# Patient Record
Sex: MALE | Race: WHITE | HISPANIC OR LATINO | Employment: OTHER | URBAN - METROPOLITAN AREA
[De-identification: names, ages, dates, MRNs, and addresses within clinical notes are randomized per-mention and may not be internally consistent; named-entity substitution may affect disease eponyms.]

---

## 2020-07-08 ENCOUNTER — HOSPITAL ENCOUNTER (EMERGENCY)
Facility: HOSPITAL | Age: 25
Discharge: HOME | End: 2020-07-08
Attending: EMERGENCY MEDICINE
Payer: OTHER GOVERNMENT

## 2020-07-08 VITALS
OXYGEN SATURATION: 98 % | HEIGHT: 71 IN | BODY MASS INDEX: 31.5 KG/M2 | TEMPERATURE: 100 F | DIASTOLIC BLOOD PRESSURE: 82 MMHG | SYSTOLIC BLOOD PRESSURE: 124 MMHG | HEART RATE: 77 BPM | RESPIRATION RATE: 20 BRPM | WEIGHT: 225 LBS

## 2020-07-08 DIAGNOSIS — R05.9 COUGH: ICD-10-CM

## 2020-07-08 DIAGNOSIS — J06.9 VIRAL URI WITH COUGH: Primary | ICD-10-CM

## 2020-07-08 LAB
INFLUENZA A, MOLECULAR: NEGATIVE
INFLUENZA B, MOLECULAR: NEGATIVE
SPECIMEN SOURCE: NORMAL

## 2020-07-08 PROCEDURE — 99283 EMERGENCY DEPT VISIT LOW MDM: CPT | Mod: 25

## 2020-07-08 PROCEDURE — 87502 INFLUENZA DNA AMP PROBE: CPT

## 2020-07-08 PROCEDURE — U0003 INFECTIOUS AGENT DETECTION BY NUCLEIC ACID (DNA OR RNA); SEVERE ACUTE RESPIRATORY SYNDROME CORONAVIRUS 2 (SARS-COV-2) (CORONAVIRUS DISEASE [COVID-19]), AMPLIFIED PROBE TECHNIQUE, MAKING USE OF HIGH THROUGHPUT TECHNOLOGIES AS DESCRIBED BY CMS-2020-01-R: HCPCS

## 2020-07-08 PROCEDURE — 25000003 PHARM REV CODE 250: Performed by: PHYSICIAN ASSISTANT

## 2020-07-08 RX ORDER — ACETAMINOPHEN 500 MG
1000 TABLET ORAL
Status: COMPLETED | OUTPATIENT
Start: 2020-07-08 | End: 2020-07-08

## 2020-07-08 RX ORDER — ALBUTEROL SULFATE 90 UG/1
1-2 AEROSOL, METERED RESPIRATORY (INHALATION) EVERY 6 HOURS PRN
Qty: 6.7 G | Refills: 0 | Status: SHIPPED | OUTPATIENT
Start: 2020-07-08 | End: 2021-07-08

## 2020-07-08 RX ADMIN — ACETAMINOPHEN 1000 MG: 500 TABLET ORAL at 11:07

## 2020-07-08 NOTE — DISCHARGE INSTRUCTIONS
Your Influenza test was negative.  Your chest x-ray did not reveal any evidence of pneumonia or consolidation.  Your COVID test is pending.  You are advised to  self-quarantine, rest, and drink plenty of clear fluids.  You are instructed to return to the emergency department immediately for any new or worsening symptoms.

## 2020-07-08 NOTE — ED PROVIDER NOTES
Encounter Date: 7/8/2020       History     Chief Complaint   Patient presents with    Fever     pt presents to ED today c/o fever, bilateral eye pain, and vomting x 3 days. pt not actively vomiting at this time.     Eye Pain    Emesis    COVID-19 Concerns     24-year-old male presents to the emergency department for evaluation of 3 day history of generalized body aches, intermittent fever, nasal congestion and cough.  Patient reports be emerging interpretation that symptoms began gradually 3 days ago and have been intermittent since onset.  She reports the fever is and intermittent subjective fever where he feels warm.  He denies any headache, dizziness, vision changes, eye pain, neck pain, chest pain, shortness of breath, abdominal pain, nausea, vomiting or diarrhea.  No treatments attempted today prior to arrival.  He denies any known sick contacts.        Review of patient's allergies indicates:  No Known Allergies  No past medical history on file.  No past surgical history on file.  No family history on file.  Social History     Tobacco Use    Smoking status: Not on file   Substance Use Topics    Alcohol use: Not on file    Drug use: Not on file     Review of Systems   Constitutional: Positive for chills and fever. Negative for activity change and appetite change.   HENT: Negative for congestion, postnasal drip, rhinorrhea, sinus pressure, sore throat, trouble swallowing and voice change.    Eyes: Negative for photophobia, pain, redness and visual disturbance.   Respiratory: Positive for cough. Negative for chest tightness and shortness of breath.    Cardiovascular: Negative for chest pain.   Gastrointestinal: Negative for abdominal pain, blood in stool, constipation, diarrhea, nausea and vomiting.   Genitourinary: Negative for dysuria, flank pain, frequency, hematuria and testicular pain.   Musculoskeletal: Negative for back pain, joint swelling, neck pain and neck stiffness.   Skin: Negative for rash.    Neurological: Negative for dizziness, syncope, weakness, light-headedness, numbness and headaches.   Hematological: Does not bruise/bleed easily.       Physical Exam     Initial Vitals [07/08/20 1041]   BP Pulse Resp Temp SpO2   124/82 77 20 100 °F (37.8 °C) 98 %      MAP       --         Physical Exam    Nursing note and vitals reviewed.  Constitutional: He appears well-developed and well-nourished. He is not diaphoretic. No distress.   HENT:   Head: Normocephalic and atraumatic.   Right Ear: External ear normal.   Left Ear: External ear normal.   Mouth/Throat: Oropharynx is clear and moist. No oropharyngeal exudate.   Eyes: Conjunctivae and EOM are normal. Pupils are equal, round, and reactive to light.   Neck: Normal range of motion. Neck supple.   Cardiovascular: Normal rate, regular rhythm and normal heart sounds.   Pulmonary/Chest: Breath sounds normal. No respiratory distress. He has no wheezes. He has no rhonchi. He has no rales. He exhibits no tenderness.   Lymphadenopathy:     He has no cervical adenopathy.   Neurological: He is alert and oriented to person, place, and time.   Skin: Skin is warm and dry.   Psychiatric: He has a normal mood and affect.         ED Course   Procedures  Labs Reviewed - No data to display       Imaging Results    None          Medical Decision Making:   Initial Assessment:   24-year-old man presents for evaluation of cough, fever and generalized body aches.  Physical exam reveals a nontoxic-appearing male in no acute distress.  Patient is mildly febrile, but other vital signs within normal limits.  Neurological exam reveals an alert and oriented patient.  Normal range of motion noted in the neck.  No respiratory distress, accessory muscle use or conversational dyspnea noted.  Patient denies any abdominal pain.  Differential Diagnosis:   COVID-19  Viral URI  Influenza  Chest x-ray ordered to assess possible pneumonia or consolidation  ED Management:  Chest x-ray report reveals  no acute findings.  COVID pending.  Influenza negative.  These findings were discussed at length patient verbalizes understanding and agreement with course of treatment.  Instructed patient to self quarantine, rest and drink plenty of clear fluids.  Instructed patient to return to the emergency department immediately for any new or worsening symptoms.                                 Clinical Impression:       ICD-10-CM ICD-9-CM   1. Viral URI with cough  J06.9 465.9    B97.89    2. Cough  R05 786.2                                Mari Barrientos PA-C  07/08/20 1239

## 2020-07-08 NOTE — ED NOTES
Patient identifiers for Filippo Frazier checked and correct.    LOC: The patient is awake, alert and aware of environment with an appropriate affect, the patient is oriented x 3 and speaking appropriately.  APPEARANCE: Patient resting comfortably and in no acute distress, patient is clean and well groomed, patient's clothing are properly fastened.  SKIN: The skin is warm and dry, patient has age appropriate skin turgor and moist mucus membranes, skin intact, no breakdown or bruising noted.  MUSCULOSKELETAL: Patient moving all extremities well, no obvious swelling or deformities noted.  RESPIRATORY: Airway is open and patent, respirations are spontaneous, patient has a normal effort and rate, no accessory muscle use noted.  Clear breath sounds bilaterally.  CARDIAC: Patient has a normal rate and rhythm, no periphreal edema noted, capillary refill < 3 seconds.  ABDOMEN: Soft and non tender to palpation, no distention noted.  Normoactive bowel sounds x4.  NEUROLOGIC: PERRL, facial expression is symmetrical, bilateral hand grasp equal and even, normal sensation in all extremities when touched with a finger.

## 2020-07-08 NOTE — Clinical Note
"Filippo "Trixie Frazier was seen and treated in our emergency department on 7/8/2020.     COVID-19 is present in our communities across the state. There is limited testing for COVID at this time, so not all patients can be tested. In this situation, your employee meets the following criteria:    Filippo Frazier has met the criteria for COVID-19 testing based upon symptoms, travel, and/or potential exposure. The test has been completed and is pending results at this time. During this time the employee is not able to work and should be quarantined per the Centers for Disease Control timelines.     If you have any questions or concerns, or if I can be of further assistance, please do not hesitate to contact me.    Sincerely,             Mari Barrientos PA-C"

## 2020-07-09 DIAGNOSIS — U07.1 COVID-19 VIRUS DETECTED: ICD-10-CM

## 2020-07-09 LAB — SARS-COV-2 RNA RESP QL NAA+PROBE: DETECTED
